# Patient Record
(demographics unavailable — no encounter records)

---

## 2024-10-08 NOTE — HISTORY OF PRESENT ILLNESS
[Diabetes Mellitus] : Diabetes Mellitus [Hyperlipidemia] : Hyperlipidemia [Hypertension] : Hypertension [Obesity] : Obesity [No episodes] : No hypoglycemic episodes since the last visit. [Does not check] : Patient does not check blood glucose regularly [Regular podiatrist visits] : Patient did not have regular podiatrist visit [Understanding of foot care] : Patient expressed understanding of foot care [Most Recent A1C: ___] : Most recent A1C was [unfilled] [Target A1C:  ___] : Target A1C is [unfilled] [Moderate Intensity] : Patient is currently on moderate intensity statin  therapy [de-identified] : none [de-identified] : does use topical otc for fungus of both great toes during the wintertime- they now clear, has eye appt in the next couple weeks, urine today for micro [Does not check BP] : The patient is not checking blood pressure [<140/90] : Target blood pressure is <140/90 [Target goal met] : BP target goal met [de-identified] : continuing with low salt DASH type diet  [Stable] : Patient is stable [Managed with medications] : managed with  medication [Low Intensity Therapy] : Patient is currently on low intensity statin  therapy [Weight Gain ___ Pounds] : Weight gain of [unfilled] pounds [___ # of attempts] : [unfilled] weight lost attempts [Following Diet Successfully] : Following the diet successfully [___ times per week] : Patient exercises [unfilled] times per week [Following  treatment as advised] : Patient is following  treatment as advised [Maintenance] : Maintenance: Patient is following a plan to lose weight, for longer than 6 months  but less than 5 years [de-identified] : slight gain since last appt but was missing medication for short time,  [de-identified] : swimming now  [de-identified] : glp1 RA as well for DM control- denies any GI SE and has been doing well

## 2024-10-08 NOTE — PHYSICAL EXAM
[No Acute Distress] : no acute distress [Normal Sclera/Conjunctiva] : normal sclera/conjunctiva [Normal Outer Ear/Nose] : the outer ears and nose were normal in appearance [No JVD] : no jugular venous distention [No Respiratory Distress] : no respiratory distress  [No Accessory Muscle Use] : no accessory muscle use [Clear to Auscultation] : lungs were clear to auscultation bilaterally [Normal Rate] : normal rate  [Regular Rhythm] : with a regular rhythm [Normal S1, S2] : normal S1 and S2 [No Murmur] : no murmur heard [Pedal Pulses Present] : the pedal pulses are present [No Edema] : there was no peripheral edema [No Focal Deficits] : no focal deficits [Normal Gait] : normal gait [Normal Affect] : the affect was normal [Alert and Oriented x3] : oriented to person, place, and time [Normal Insight/Judgement] : insight and judgment were intact [None] : no ulcers in either foot were found [] : both feet [de-identified] : obese [de-identified] : right knee with some fluid in the area below the patella, normal ROM flexion, extension but tender in the area of the patella [de-identified] : skin not dry today advised continued moisturizer regularly almost no dryness today [de-identified] : fungal infection of the toes has cleared [de-identified] : minimal decrease sensation of the right great toe in area of scar tissue from bunion surgery- no change from prior exam [TWNoteComboBox3] : +2 [TWNoteComboBox4] : +2

## 2024-10-08 NOTE — PHYSICAL EXAM
[No Acute Distress] : no acute distress [Normal Sclera/Conjunctiva] : normal sclera/conjunctiva [Normal Outer Ear/Nose] : the outer ears and nose were normal in appearance [No JVD] : no jugular venous distention [No Respiratory Distress] : no respiratory distress  [No Accessory Muscle Use] : no accessory muscle use [Clear to Auscultation] : lungs were clear to auscultation bilaterally [Normal Rate] : normal rate  [Regular Rhythm] : with a regular rhythm [Normal S1, S2] : normal S1 and S2 [No Murmur] : no murmur heard [Pedal Pulses Present] : the pedal pulses are present [No Edema] : there was no peripheral edema [No Focal Deficits] : no focal deficits [Normal Gait] : normal gait [Normal Affect] : the affect was normal [Alert and Oriented x3] : oriented to person, place, and time [Normal Insight/Judgement] : insight and judgment were intact [None] : no ulcers in either foot were found [] : both feet [de-identified] : right knee with some fluid in the area below the patella, normal ROM flexion, extension but tender in the area of the patella [de-identified] : obese [de-identified] : skin not dry today advised continued moisturizer regularly almost no dryness today [de-identified] : fungal infection of the toes has cleared [de-identified] : minimal decrease sensation of the right great toe in area of scar tissue from bunion surgery- no change from prior exam [TWNoteComboBox3] : +2 [TWNoteComboBox4] : +2

## 2024-10-08 NOTE — REVIEW OF SYSTEMS
[Muscle Pain] : muscle pain [Back Pain] : back pain [Headache] : no headache [Negative] : Psychiatric [FreeTextEntry9] : cramping in the right leg in the medial thigh to the knee, trouble stretching it to stop the cramp

## 2024-10-08 NOTE — COUNSELING
[Potential consequences of obesity discussed] : Potential consequences of obesity discussed [Benefits of weight loss discussed] : Benefits of weight loss discussed [Encouraged to increase physical activity] : Encouraged to increase physical activity [Decrease Portions] : decrease portions [FreeTextEntry1] : WW and ozempic

## 2024-10-08 NOTE — ASSESSMENT
[FreeTextEntry1] : review of medications, results of labs from 3 weeks ago, no changes at this time, tolerating ozempic with good weight loss and control of diabetes at this time weight 278 now at 248  slight increase in weight since last visit - small gap for ozempic- now taking regularly at optimized dose- continue with same review of labs from last week. urine for micro obtained today and order reinstated moderate MDM, continuity of care with f/u in 3-4 months

## 2024-10-08 NOTE — HISTORY OF PRESENT ILLNESS
[Diabetes Mellitus] : Diabetes Mellitus [Hyperlipidemia] : Hyperlipidemia [Hypertension] : Hypertension [Obesity] : Obesity [No episodes] : No hypoglycemic episodes since the last visit. [Does not check] : Patient does not check blood glucose regularly [Regular podiatrist visits] : Patient did not have regular podiatrist visit [Understanding of foot care] : Patient expressed understanding of foot care [Most Recent A1C: ___] : Most recent A1C was [unfilled] [Target A1C:  ___] : Target A1C is [unfilled] [Moderate Intensity] : Patient is currently on moderate intensity statin  therapy [de-identified] : none [de-identified] : does use topical otc for fungus of both great toes during the wintertime- they now clear, has eye appt in the next couple weeks, urine today for micro [Does not check BP] : The patient is not checking blood pressure [<140/90] : Target blood pressure is <140/90 [Target goal met] : BP target goal met [de-identified] : continuing with low salt DASH type diet  [Stable] : Patient is stable [Managed with medications] : managed with  medication [Low Intensity Therapy] : Patient is currently on low intensity statin  therapy [Weight Gain ___ Pounds] : Weight gain of [unfilled] pounds [___ # of attempts] : [unfilled] weight lost attempts [Following Diet Successfully] : Following the diet successfully [___ times per week] : Patient exercises [unfilled] times per week [Following  treatment as advised] : Patient is following  treatment as advised [Maintenance] : Maintenance: Patient is following a plan to lose weight, for longer than 6 months  but less than 5 years [de-identified] : slight gain since last appt but was missing medication for short time,  [de-identified] : swimming now  [de-identified] : glp1 RA as well for DM control- denies any GI SE and has been doing well

## 2025-01-20 NOTE — ASSESSMENT
[FreeTextEntry1] : advised patient to keep next appt. recheck for lymph nodes and consider repeat ct scan.  labs ordered for regular follow up of chronic disease  moderate level MDM for follow up of undx issue - lymphadenopathy continuity of care for this visit

## 2025-01-20 NOTE — PHYSICAL EXAM
[No Acute Distress] : no acute distress [Normal Sclera/Conjunctiva] : normal sclera/conjunctiva [PERRL] : pupils equal round and reactive to light [EOMI] : extraocular movements intact [Fundoscopic Exam Performed] : fundoscopic ~T exam ~C was performed [Normal Outer Ear/Nose] : the outer ears and nose were normal in appearance [Normal Oropharynx] : the oropharynx was normal [Normal TMs] : both tympanic membranes were normal [No JVD] : no jugular venous distention [Supple] : supple [Thyroid Normal, No Nodules] : the thyroid was normal and there were no nodules present [No Respiratory Distress] : no respiratory distress  [No Accessory Muscle Use] : no accessory muscle use [Clear to Auscultation] : lungs were clear to auscultation bilaterally [Normal Rate] : normal rate  [Regular Rhythm] : with a regular rhythm [Normal S1, S2] : normal S1 and S2 [Normal Supraclavicular Nodes] : no supraclavicular lymphadenopathy [Normal Posterior Cervical Nodes] : no posterior cervical lymphadenopathy [Normal Anterior Cervical Nodes] : no anterior cervical lymphadenopathy [de-identified] : no palpable nodes at this time

## 2025-01-20 NOTE — HEALTH RISK ASSESSMENT
[Yes] : Yes [No] : In the past 12 months have you used drugs other than those required for medical reasons? No [Any fall with injury in past year] : Patient reported fall with injury in the past year [Intercurrent ED visits] : went to ED [Former] : Former [10-14] : 10-14 [> 15 Years] : > 15 Years [de-identified] : none [de-identified] : walking [de-identified] : not good over holidays [de-identified] : quit 1992

## 2025-01-20 NOTE — HISTORY OF PRESENT ILLNESS
[FreeTextEntry1] : CC: fell from chair while at work 1/10/25. In falling she injured her head banging it along the floor/wall when she fell. She had no LOC but did incur a lump on the back of her head from the fall. She was seen in the ER and did have a CT scan of the head -non-contrast. which failed to reveal any acute head injury such as fracture or bleeding. Hematoma of the scalp was noted as were several small posterior lymph nodes likely secondary to previous illness. She is here today for follow up.

## 2025-01-20 NOTE — REVIEW OF SYSTEMS
[Muscle Pain] : muscle pain negative [Back Pain] : back pain [Negative] : Psychiatric [Headache] : no headache [FreeTextEntry9] : cramping in the right leg in the medial thigh to the knee, trouble stretching it to stop the cramp [de-identified] : tenderness over the hematoma of the scalp

## 2025-01-20 NOTE — REVIEW OF SYSTEMS
[Muscle Pain] : muscle pain [Back Pain] : back pain [Negative] : Psychiatric [Headache] : no headache [FreeTextEntry9] : cramping in the right leg in the medial thigh to the knee, trouble stretching it to stop the cramp [de-identified] : tenderness over the hematoma of the scalp

## 2025-01-20 NOTE — PHYSICAL EXAM
[No Acute Distress] : no acute distress [Normal Sclera/Conjunctiva] : normal sclera/conjunctiva [PERRL] : pupils equal round and reactive to light [EOMI] : extraocular movements intact [Fundoscopic Exam Performed] : fundoscopic ~T exam ~C was performed [Normal Outer Ear/Nose] : the outer ears and nose were normal in appearance [Normal Oropharynx] : the oropharynx was normal [Normal TMs] : both tympanic membranes were normal [No JVD] : no jugular venous distention [Supple] : supple [Thyroid Normal, No Nodules] : the thyroid was normal and there were no nodules present [No Respiratory Distress] : no respiratory distress  [No Accessory Muscle Use] : no accessory muscle use [Clear to Auscultation] : lungs were clear to auscultation bilaterally [Normal Rate] : normal rate  [Regular Rhythm] : with a regular rhythm [Normal S1, S2] : normal S1 and S2 [Normal Supraclavicular Nodes] : no supraclavicular lymphadenopathy [Normal Posterior Cervical Nodes] : no posterior cervical lymphadenopathy [Normal Anterior Cervical Nodes] : no anterior cervical lymphadenopathy [de-identified] : no palpable nodes at this time

## 2025-01-20 NOTE — HEALTH RISK ASSESSMENT
[Yes] : Yes [No] : In the past 12 months have you used drugs other than those required for medical reasons? No [Any fall with injury in past year] : Patient reported fall with injury in the past year [Intercurrent ED visits] : went to ED [Former] : Former [10-14] : 10-14 [> 15 Years] : > 15 Years [de-identified] : none [de-identified] : walking [de-identified] : not good over holidays [de-identified] : quit 1992

## 2025-03-26 NOTE — PHYSICAL EXAM
[No Acute Distress] : no acute distress [Normal Sclera/Conjunctiva] : normal sclera/conjunctiva [Normal Outer Ear/Nose] : the outer ears and nose were normal in appearance [No JVD] : no jugular venous distention [No Respiratory Distress] : no respiratory distress  [No Accessory Muscle Use] : no accessory muscle use [Clear to Auscultation] : lungs were clear to auscultation bilaterally [Normal Rate] : normal rate  [Regular Rhythm] : with a regular rhythm [Normal S1, S2] : normal S1 and S2 [No Murmur] : no murmur heard [Pedal Pulses Present] : the pedal pulses are present [No Edema] : there was no peripheral edema [No Focal Deficits] : no focal deficits [Normal Gait] : normal gait [Normal Affect] : the affect was normal [Alert and Oriented x3] : oriented to person, place, and time [Normal Insight/Judgement] : insight and judgment were intact [None] : no ulcers in either foot were found [] : both feet [de-identified] : obese [de-identified] : right knee with some fluid in the area below the patella, normal ROM flexion, extension but tender in the area of the patella [de-identified] : skin not dry today advised continued moisturizer regularly almost no dryness today [de-identified] : fungal infection of the toes has cleared [de-identified] : minimal decrease sensation of the right great toe in area of scar tissue from bunion surgery- no change from prior exam [TWNoteComboBox3] : +2 [TWNoteComboBox4] : +2

## 2025-03-26 NOTE — REVIEW OF SYSTEMS
[Muscle Pain] : muscle pain [Back Pain] : back pain [Negative] : Psychiatric [Headache] : no headache [FreeTextEntry9] : cramping in the right leg in the medial thigh to the knee, trouble stretching it to stop the cramp

## 2025-03-26 NOTE — PHYSICAL EXAM
[No Acute Distress] : no acute distress [Normal Sclera/Conjunctiva] : normal sclera/conjunctiva [Normal Outer Ear/Nose] : the outer ears and nose were normal in appearance [No JVD] : no jugular venous distention [No Respiratory Distress] : no respiratory distress  [No Accessory Muscle Use] : no accessory muscle use [Clear to Auscultation] : lungs were clear to auscultation bilaterally [Normal Rate] : normal rate  [Regular Rhythm] : with a regular rhythm [Normal S1, S2] : normal S1 and S2 [No Murmur] : no murmur heard [Pedal Pulses Present] : the pedal pulses are present [No Edema] : there was no peripheral edema [No Focal Deficits] : no focal deficits [Normal Gait] : normal gait [Normal Affect] : the affect was normal [Alert and Oriented x3] : oriented to person, place, and time [Normal Insight/Judgement] : insight and judgment were intact [None] : no ulcers in either foot were found [] : both feet [de-identified] : obese [de-identified] : right knee with some fluid in the area below the patella, normal ROM flexion, extension but tender in the area of the patella [de-identified] : skin not dry today advised continued moisturizer regularly almost no dryness today [de-identified] : fungal infection of the toes has cleared [de-identified] : minimal decrease sensation of the right great toe in area of scar tissue from bunion surgery- no change from prior exam [TWNoteComboBox3] : +2 [TWNoteComboBox4] : +2

## 2025-03-26 NOTE — HISTORY OF PRESENT ILLNESS
[Diabetes Mellitus] : Diabetes Mellitus [Hyperlipidemia] : Hyperlipidemia [Hypertension] : Hypertension [Obesity] : Obesity [No episodes] : No hypoglycemic episodes since the last visit. [Does not check] : Patient does not check blood glucose regularly [Understanding of foot care] : Patient expressed understanding of foot care [Most Recent A1C: ___] : Most recent A1C was [unfilled] [Target A1C:  ___] : Target A1C is [unfilled] [Moderate Intensity] : Patient is currently on moderate intensity statin  therapy [Does not check BP] : The patient is not checking blood pressure [<140/90] : Target blood pressure is <140/90 [Target goal met] : BP target goal met [Stable] : Patient is stable [Managed with medications] : managed with  medication [Low Intensity Therapy] : Patient is currently on low intensity statin  therapy [Weight Gain ___ Pounds] : Weight gain of [unfilled] pounds [___ # of attempts] : [unfilled] weight lost attempts [Following Diet Successfully] : Following the diet successfully [___ times per week] : Patient exercises [unfilled] times per week [Following  treatment as advised] : Patient is following  treatment as advised [Maintenance] : Maintenance: Patient is following a plan to lose weight, for longer than 6 months  but less than 5 years [Regular podiatrist visits] : Patient did not have regular podiatrist visit [de-identified] : none [de-identified] : continuing with low salt DASH type diet also low fat [Weight Loss ___ Pounds] : Weight loss of [unfilled] pounds [de-identified] : slight loss since last appt, asking about change in dose of meds [de-identified] : swimming now  [de-identified] : glp1 RA as well for DM control- denies any GI SE and has been doing well but missed doses due to med not available and thus weight gain again-

## 2025-03-26 NOTE — ASSESSMENT
[FreeTextEntry1] : review of medications,no changes at this time, tolerating ozempic with issue in obtaining the med for a while resulting in weight gain and some increase in glucose over that time frame. weight gain secondary to being off meds also A1C increased again due to the same and now weight loss last 3 months with full dose of ozempic- tolerating ok without much GI upset review of recent labs low level MDM, continuity of care with f/u in 3 months to call for any side effects of GLP1-ra-

## 2025-03-26 NOTE — CURRENT MEDS
[Takes medication as prescribed] : takes [None] : Patient does not have any barriers to medication adherence [Yes] : Reviewed medication list for presence of high-risk medications. [FreeTextEntry1] : GLP1

## 2025-03-26 NOTE — HISTORY OF PRESENT ILLNESS
[Diabetes Mellitus] : Diabetes Mellitus [Hyperlipidemia] : Hyperlipidemia [Hypertension] : Hypertension [Obesity] : Obesity [No episodes] : No hypoglycemic episodes since the last visit. [Does not check] : Patient does not check blood glucose regularly [Understanding of foot care] : Patient expressed understanding of foot care [Most Recent A1C: ___] : Most recent A1C was [unfilled] [Target A1C:  ___] : Target A1C is [unfilled] [Moderate Intensity] : Patient is currently on moderate intensity statin  therapy [Does not check BP] : The patient is not checking blood pressure [<140/90] : Target blood pressure is <140/90 [Target goal met] : BP target goal met [Stable] : Patient is stable [Managed with medications] : managed with  medication [Low Intensity Therapy] : Patient is currently on low intensity statin  therapy [Weight Gain ___ Pounds] : Weight gain of [unfilled] pounds [___ # of attempts] : [unfilled] weight lost attempts [Following Diet Successfully] : Following the diet successfully [___ times per week] : Patient exercises [unfilled] times per week [Following  treatment as advised] : Patient is following  treatment as advised [Maintenance] : Maintenance: Patient is following a plan to lose weight, for longer than 6 months  but less than 5 years [Regular podiatrist visits] : Patient did not have regular podiatrist visit [de-identified] : none [de-identified] : continuing with low salt DASH type diet also low fat [Weight Loss ___ Pounds] : Weight loss of [unfilled] pounds [de-identified] : slight loss since last appt, asking about change in dose of meds [de-identified] : swimming now  [de-identified] : glp1 RA as well for DM control- denies any GI SE and has been doing well but missed doses due to med not available and thus weight gain again-

## 2025-04-23 NOTE — HISTORY OF PRESENT ILLNESS
[de-identified] : 62 year old female with significant PMHx of DM on Ozempic for 2 years, last documented HgbA1C is a 6.2%.  presents for follow-up visit today s/p right knee Durolane injection on last visit on 8/7/2024. Patient also had a right knee cortisone injection on 7/24/2024. Patient states that right knee pain has been stable, improves s/p gel and cortisone injections. Patient denies any radiating pain, swelling or buckling, numbness/tingling and weakness to LE. Patient denies any aggravating factors. Patient denies taking any prescription or over-the-counter pain medications at this time. Of note, patient reports history of a "knee fracture" about 10 years ago. She wore 2 different braces and healed well. Patient otherwise states that she has been in her usual state of health. Patient ambulates without assistance and can perform ADL's independently.   Radiology Results: 7/24/2024 Right Knee: Standing AP, lateral, tunnel and skyline views were obtained and reveal severe medial and moderate patellofemoral arthritis

## 2025-04-23 NOTE — DISCUSSION/SUMMARY
[de-identified] : I went over the pathophysiology of the patient's symptoms in great detail with the patient. Viscosupplementation was discussed as a solution to the patient's symptoms and a booklet with information was provided. Viscosupplementation was discussed as a solution to the patient's symptoms, and we are requesting Durolane for right knee. I stressed the importance of weight loss and its benefits to the patients joints and overall health.   All of their questions were answered. They understand and consent to the plan.   FU after authorization.

## 2025-04-23 NOTE — ADDENDUM
[FreeTextEntry1] : I, KAMILLA COLEMAN, acted solely as a scribe for Dr. Patric Welsh on this date 04/23/2025.  All medical record entries made by the Scribe were at my, Dr. Patric Welsh, direction and personally dictated by me on 04/23/2025. I have reviewed the chart and agree that the record accurately reflects my personal performance of the history, physical exam, assessment and plan. I have also personally directed, reviewed, and agreed with the chart.

## 2025-04-23 NOTE — PHYSICAL EXAM
[de-identified] : Constitutional o Appearance : well-nourished, well developed, alert, in no acute distress  Head and Face o Head :  Inspection : atraumatic, normocephalic o Face :  Inspection : no visible rash or discoloration Respiratory o Respiratory Effort: breathing unlabored  Neurologic o Mental Status Examination :  Orientation : grossly oriented to person, place and time Psychiatric o Mood and Affect: mood normal, affect appropriate   Right Lower Extremity o Buttock : no tenderness, swelling or deformities  o Right Hip :  Inspection/Palpation : no tenderness, swelling or deformities  Range of Motion : full and painless in all planes, no crepitance  Stability : joint stability intact  Strength : extension, flexion, adduction, abduction, internal rotation and external rotation 5/5   o Right Knee :  Inspection/Palpation : lateral compartment tenderness,  to palpation, no swelling  Range of Motion :  0-120, no crepitance, decreased patellofemoral glide,   Stability : no valgus or varus instability present on provocative testing  Strength : flexion and extension 5/5  Tests and Signs : Anterior Drawer negative, Lachman negative, Hakan's negative  Left Lower Extremity o Buttock : no tenderness, swelling or deformities  o Left Hip :  Inspection/Palpation : no tenderness, no swelling or deformities  Range of Motion : full and painless in all planes, no crepitance  Stability : joint stability intact  Strength : extension, flexion, adduction, abduction, internal rotation and external rotation 5/5  o Left Knee :  Inspection/Palpation : no tenderness to palpation, no swelling  Range of Motion : 0-120 active flexion and extension full and painless, no crepitance  Stability : no valgus or varus instability present on provocative testing  Strength : flexion and extension 5/5  Tests and Signs : Anterior Drawer negative, Lachman negative, Hakan's negative  Gait: normal gait, no foot drop, no significant extremity swelling or lymphedem

## 2025-04-30 NOTE — HISTORY OF PRESENT ILLNESS
[de-identified] : 62 year old female with significant PMHx of DM on Ozempic for 2 years, last documented HgbA1C is a 6.2%.  presents for a right knee Durolane injection today 4/30/2025. Patient denies any radiating pain, swelling or buckling, numbness/tingling and weakness to LE. Patient denies any aggravating factors. Patient denies taking any prescription or over-the-counter pain medications at this time. Of note, patient reports history of a "knee fracture" about 10 years ago. She wore 2 different braces and healed well. Patient otherwise states that she has been in her usual state of health. Patient ambulates without assistance and can perform ADL's independently.   Radiology Results: 7/24/2024 Right Knee: Standing AP, lateral, tunnel and skyline views were obtained and reveal severe medial and moderate patellofemoral arthritis

## 2025-04-30 NOTE — ADDENDUM
[FreeTextEntry1] : I, KAMILLA COLEMAN, acted solely as a scribe for Dr. Patric Welsh on this date 04/30/2025.  All medical record entries made by the Scribe were at my, Dr. Patric Welsh, direction and personally dictated by me on 04/30/2025. I have reviewed the chart and agree that the record accurately reflects my personal performance of the history, physical exam, assessment and plan. I have also personally directed, reviewed, and agreed with the chart.

## 2025-04-30 NOTE — PHYSICAL EXAM
[de-identified] : Constitutional o Appearance : well-nourished, well developed, alert, in no acute distress  Head and Face o Head :  Inspection : atraumatic, normocephalic o Face :  Inspection : no visible rash or discoloration Respiratory o Respiratory Effort: breathing unlabored  Neurologic o Mental Status Examination :  Orientation : grossly oriented to person, place and time Psychiatric o Mood and Affect: mood normal, affect appropriate   Right Lower Extremity o Buttock : no tenderness, swelling or deformities  o Right Hip :  Inspection/Palpation : no tenderness, swelling or deformities  Range of Motion : full and painless in all planes, no crepitance  Stability : joint stability intact  Strength : extension, flexion, adduction, abduction, internal rotation and external rotation 5/5   o Right Knee :  Inspection/Palpation : lateral compartment tenderness,  to palpation, no swelling  Range of Motion :  0-120, no crepitance, decreased patellofemoral glide, mild crepitance,  Stability : no valgus or varus instability present on provocative testing  Strength : flexion and extension 5/5  Tests and Signs : Anterior Drawer negative, Lachman negative, Hakan's negative  Left Lower Extremity o Buttock : no tenderness, swelling or deformities  o Left Hip :  Inspection/Palpation : no tenderness, no swelling or deformities  Range of Motion : full and painless in all planes, no crepitance  Stability : joint stability intact  Strength : extension, flexion, adduction, abduction, internal rotation and external rotation 5/5  o Left Knee :  Inspection/Palpation : no tenderness to palpation, no swelling  Range of Motion : 0-120 active flexion and extension full and painless, no crepitance  Stability : no valgus or varus instability present on provocative testing  Strength : flexion and extension 5/5  Tests and Signs : Anterior Drawer negative, Lachman negative, Hakan's negative  Gait: normal gait, no foot drop, no significant extremity swelling or lymphedema  o Knee injection : Indication- knee osteoarthritis, Anatomic location- right intra-articular joint space, Spray - area was sterilized with Betadine and alcohol and anesthetized with Ethyl Chloride , needle used-20G, Medications given- 60mg/3mL Durolane under Ultrasound guidance. The patient tolerated the procedure well.

## 2025-04-30 NOTE — DISCUSSION/SUMMARY
[de-identified] : I went over the pathophysiology of the patient's symptoms in great detail with the patient. The patient elected to receive a Durolane injection into her right knee today, and tolerated it well. I instructed the patient on ROM exercises, and told them to take it easy. The use of ice and rest was reviewed with the patient. The patient may resume activities tomorrow. I reminded the patient that it takes 4 to 6 weeks after the final injection to feel symptom relief.   All of their questions were answered. They understand and consent to the plan.   FU in 6 weeks

## 2025-06-11 NOTE — DISCUSSION/SUMMARY
[de-identified] : I went over the pathophysiology of the patient's symptoms in great detail with the patient. She needs to avoid high-impact activities such as running and jumping or riding a treadmill. I recommend alternative activities such as riding a stationary bike or elliptical on low tension. She should focus on light weight and high repetition exercises. She should avoid squatting and kneeling. We discussed the use of ice, Tylenol and anti-inflammatories to relieve pain. The benefits of pool exercises were discussed in detail with the patient. I stressed the importance of weight loss and its benefits to the patients joints and overall health. I informed the patient they are due for a repeat gel injection any time after 10/30/2025 for the right knee.   All of their questions were answered. They understand and consent to the plan.   FU in 3 months. She will get right knee films upon return.

## 2025-06-11 NOTE — PHYSICAL EXAM
[de-identified] : Constitutional o Appearance : well-nourished, well developed, alert, in no acute distress  Head and Face o Head :  Inspection : atraumatic, normocephalic o Face :  Inspection : no visible rash or discoloration Respiratory o Respiratory Effort: breathing unlabored  Neurologic o Mental Status Examination :  Orientation : grossly oriented to person, place and time Psychiatric o Mood and Affect: mood normal, affect appropriate   Right Lower Extremity o Buttock : no tenderness, swelling or deformities  o Right Hip :  Inspection/Palpation : no tenderness, swelling or deformities  Range of Motion : full and painless in all planes, no crepitance  Stability : joint stability intact  Strength : extension, flexion, adduction, abduction, internal rotation and external rotation 4+/5   o Right Knee :  Inspection/Palpation : medial and lateral compartment tenderness to palpation, mild swelling  Range of Motion :  0-130, no crepitance, decreased patellofemoral glide, mild crepitance,  Stability : no valgus or varus instability present on provocative testing  Strength : flexion and extension 4+/5  Tests and Signs : Anterior Drawer negative, Lachman negative, Hakan's negative  Left Lower Extremity o Buttock : no tenderness, swelling or deformities  o Left Hip :  Inspection/Palpation : no tenderness, no swelling or deformities  Range of Motion : full and painless in all planes, no crepitance  Stability : joint stability intact  Strength : extension, flexion, adduction, abduction, internal rotation and external rotation 5/5  o Left Knee :  Inspection/Palpation : medial compartment tenderness tenderness to palpation, no swelling  Range of Motion : 0-120 active flexion and extension full and painless, no crepitance  Stability : no valgus or varus instability present on provocative testing  Strength : flexion and extension 5/5  Tests and Signs : Anterior Drawer negative, Lachman negative, Hakan's negative  Gait: normal gait, no foot drop, limited core strength, no significant extremity swelling or lymphedema

## 2025-06-11 NOTE — HISTORY OF PRESENT ILLNESS
[de-identified] : 62 year old female with significant PMHx of DM on Ozempic for 2 years, last documented HgbA1C is a 6.2%.  presents for a right knee follow-up. She had a right knee Durolane injection on 4/30/2025 and notes she is 70-80% better.  Patient denies any radiating pain, swelling or buckling, numbness/tingling and weakness to LE. Patient denies any aggravating factors. Patient denies taking any prescription or over-the-counter pain medications at this time. Of note, patient reports history of a "knee fracture" about 10 years ago. She wore 2 different braces and healed well.   Radiology Results: 7/24/2024 Right Knee: Standing AP, lateral, tunnel and skyline views were obtained and reveal severe medial and moderate patellofemoral arthritis

## 2025-06-11 NOTE — ADDENDUM
[FreeTextEntry1] : I, KAMILLA COLEMAN, acted solely as a scribe for Dr. Patric Welsh on this date 06/11/2025.  All medical record entries made by the Scribe were at my, Dr. Patric Welsh, direction and personally dictated by me on 06/11/2025. I have reviewed the chart and agree that the record accurately reflects my personal performance of the history, physical exam, assessment and plan. I have also personally directed, reviewed, and agreed with the chart.

## 2025-06-29 NOTE — HISTORY OF PRESENT ILLNESS
[Diabetes Mellitus] : Diabetes Mellitus [Hyperlipidemia] : Hyperlipidemia [Hypertension] : Hypertension [Obesity] : Obesity [No episodes] : No hypoglycemic episodes since the last visit. [Does not check] : Patient does not check blood glucose regularly [Understanding of foot care] : Patient expressed understanding of foot care [Most Recent A1C: ___] : Most recent A1C was [unfilled] [Target A1C:  ___] : Target A1C is [unfilled] [Moderate Intensity] : Patient is currently on moderate intensity statin  therapy [Does not check BP] : The patient is not checking blood pressure [<140/90] : Target blood pressure is <140/90 [Target goal met] : BP target goal met [Stable] : Patient is stable [Managed with medications] : managed with  medication [Low Intensity Therapy] : Patient is currently on low intensity statin  therapy [Weight Gain ___ Pounds] : Weight gain of [unfilled] pounds [___ # of attempts] : [unfilled] weight lost attempts [Following Diet Successfully] : Following the diet successfully [___ times per week] : Patient exercises [unfilled] times per week [Following  treatment as advised] : Patient is following  treatment as advised [Maintenance] : Maintenance: Patient is following a plan to lose weight, for longer than 6 months  but less than 5 years [Regular podiatrist visits] : Patient did not have regular podiatrist visit [de-identified] : none [de-identified] : continuing with low salt DASH type diet also low fat [de-identified] :  weight fairly stable at this time [de-identified] : swimming now  [de-identified] : glp1 RA as well for DM control- denies any GI SE and has been doing well but missed doses due to med not available and thus weight gain again-

## 2025-06-29 NOTE — HISTORY OF PRESENT ILLNESS
[Diabetes Mellitus] : Diabetes Mellitus [Hyperlipidemia] : Hyperlipidemia [Hypertension] : Hypertension [Obesity] : Obesity [No episodes] : No hypoglycemic episodes since the last visit. [Does not check] : Patient does not check blood glucose regularly [Understanding of foot care] : Patient expressed understanding of foot care [Most Recent A1C: ___] : Most recent A1C was [unfilled] [Target A1C:  ___] : Target A1C is [unfilled] [Moderate Intensity] : Patient is currently on moderate intensity statin  therapy [Does not check BP] : The patient is not checking blood pressure [<140/90] : Target blood pressure is <140/90 [Target goal met] : BP target goal met [Stable] : Patient is stable [Managed with medications] : managed with  medication [Low Intensity Therapy] : Patient is currently on low intensity statin  therapy [Weight Gain ___ Pounds] : Weight gain of [unfilled] pounds [___ # of attempts] : [unfilled] weight lost attempts [Following Diet Successfully] : Following the diet successfully [___ times per week] : Patient exercises [unfilled] times per week [Following  treatment as advised] : Patient is following  treatment as advised [Maintenance] : Maintenance: Patient is following a plan to lose weight, for longer than 6 months  but less than 5 years [Regular podiatrist visits] : Patient did not have regular podiatrist visit [de-identified] : none [de-identified] : continuing with low salt DASH type diet also low fat [de-identified] :  weight fairly stable at this time [de-identified] : glp1 RA as well for DM control- denies any GI SE and has been doing well but missed doses due to med not available and thus weight gain again-  [de-identified] : swimming now

## 2025-06-29 NOTE — REVIEW OF SYSTEMS
[Muscle Pain] : muscle pain [Back Pain] : back pain [Negative] : Psychiatric [Headache] : no headache [FreeTextEntry4] : sinus congestion for the last couple days but no cough or sob and denies roxi chest pain- feels like allergies  [FreeTextEntry9] : improvement of leg cramps

## 2025-06-29 NOTE — PHYSICAL EXAM
[No Acute Distress] : no acute distress [Normal Sclera/Conjunctiva] : normal sclera/conjunctiva [Normal Outer Ear/Nose] : the outer ears and nose were normal in appearance [No JVD] : no jugular venous distention [No Respiratory Distress] : no respiratory distress  [No Accessory Muscle Use] : no accessory muscle use [Clear to Auscultation] : lungs were clear to auscultation bilaterally [Normal Rate] : normal rate  [Regular Rhythm] : with a regular rhythm [Normal S1, S2] : normal S1 and S2 [No Murmur] : no murmur heard [Pedal Pulses Present] : the pedal pulses are present [No Edema] : there was no peripheral edema [No Focal Deficits] : no focal deficits [Normal Gait] : normal gait [Normal Affect] : the affect was normal [Alert and Oriented x3] : oriented to person, place, and time [Normal Insight/Judgement] : insight and judgment were intact [None] : no ulcers in either foot were found [] : both feet [de-identified] : obese [de-identified] : right knee with some fluid in the area below the patella, normal ROM flexion, extension but tender in the area of the patella [de-identified] : callous oin the bottom of the first metatarsal left foot- advise if persist podiatry [de-identified] : fungal infection of the toes has cleared [TWNoteComboBox3] : +2 [TWNoteComboBox4] : +2

## 2025-06-29 NOTE — COUNSELING
[Potential consequences of obesity discussed] : Potential consequences of obesity discussed [Benefits of weight loss discussed] : Benefits of weight loss discussed [Encouraged to increase physical activity] : Encouraged to increase physical activity [Decrease Portions] : decrease portions [____ min/wk Activity] : [unfilled] min/wk activity [Needs reinforcement, provided] : Patient needs reinforcement on understanding of disease, goals and obesity follow-up plan; reinforcement was provided [FreeTextEntry1] : WW and ozempic [FreeTextEntry3] : continue with ozempic for dm and weight loss, cardiovascular protection

## 2025-06-29 NOTE — ASSESSMENT
[FreeTextEntry1] : review of medications, no changes at this time, tolerating ozempic with issue in obtaining the med for a while resulting in weight gain and some increase in glucose over that time frame. weight gain secondary to being off meds also A1C increased again due to the same slight increase in weight since last visit - gap fr ozempic- now taking regularly at optimized dose- continue with same  moderate MDM, continuity of care with f/u in 3 months with labs as ordered to call for any side effects of GLP1-ra orders for labs and microalbumin also due for mammogram which is ordered today

## 2025-06-29 NOTE — PHYSICAL EXAM
[No Acute Distress] : no acute distress [Normal Sclera/Conjunctiva] : normal sclera/conjunctiva [Normal Outer Ear/Nose] : the outer ears and nose were normal in appearance [No JVD] : no jugular venous distention [No Respiratory Distress] : no respiratory distress  [No Accessory Muscle Use] : no accessory muscle use [Clear to Auscultation] : lungs were clear to auscultation bilaterally [Normal Rate] : normal rate  [Regular Rhythm] : with a regular rhythm [Normal S1, S2] : normal S1 and S2 [No Murmur] : no murmur heard [Pedal Pulses Present] : the pedal pulses are present [No Edema] : there was no peripheral edema [No Focal Deficits] : no focal deficits [Normal Gait] : normal gait [Normal Affect] : the affect was normal [Alert and Oriented x3] : oriented to person, place, and time [Normal Insight/Judgement] : insight and judgment were intact [None] : no ulcers in either foot were found [] : both feet [de-identified] : obese [de-identified] : right knee with some fluid in the area below the patella, normal ROM flexion, extension but tender in the area of the patella [de-identified] : callous oin the bottom of the first metatarsal left foot- advise if persist podiatry [de-identified] : fungal infection of the toes has cleared [TWNoteComboBox3] : +2 [TWNoteComboBox4] : +2 Adjacent Tissue Transfer Text: The defect edges were debeveled with a #15 scalpel blade.  Given the location of the defect and the proximity to free margins an adjacent tissue transfer was deemed most appropriate.  Using a sterile surgical marker, an appropriate flap was drawn incorporating the defect and placing the expected incisions within the relaxed skin tension lines where possible.    The area thus outlined was incised deep to adipose tissue with a #15 scalpel blade.  The skin margins were undermined to an appropriate distance in all directions utilizing iris scissors.

## 2025-07-09 NOTE — PHYSICAL EXAM
[No Acute Distress] : no acute distress [Normal Sclera/Conjunctiva] : normal sclera/conjunctiva [Normal Outer Ear/Nose] : the outer ears and nose were normal in appearance [No JVD] : no jugular venous distention [No Respiratory Distress] : no respiratory distress  [No Accessory Muscle Use] : no accessory muscle use [Clear to Auscultation] : lungs were clear to auscultation bilaterally [Normal Rate] : normal rate  [Regular Rhythm] : with a regular rhythm [Normal S1, S2] : normal S1 and S2 [No Murmur] : no murmur heard [Pedal Pulses Present] : the pedal pulses are present [No Edema] : there was no peripheral edema [No Focal Deficits] : no focal deficits [Normal Gait] : normal gait [Normal Affect] : the affect was normal [Alert and Oriented x3] : oriented to person, place, and time [Normal Insight/Judgement] : insight and judgment were intact [None] : no ulcers in either foot were found [] : both feet [de-identified] : obese [de-identified] : minimally tender behind the right knee but not in the thigh or the calf at this time [de-identified] : callous oin the bottom of the first metatarsal left foot- advise if persist podiatry [de-identified] : fungal infection of the toes has cleared [TWNoteComboBox3] : +2 [TWNoteComboBox4] : +2 [80992 - Moderate Complexity requires multiple possible diagnoses and/or the management options, moderate complexity of the medical data (tests, etc.) to be reviewed, and moderate risk of significant complications, morbidity, and/or mortality as well as co] : Moderate Complexity

## 2025-07-09 NOTE — PHYSICAL EXAM
[No Acute Distress] : no acute distress [Normal Sclera/Conjunctiva] : normal sclera/conjunctiva [Normal Outer Ear/Nose] : the outer ears and nose were normal in appearance [No JVD] : no jugular venous distention [No Respiratory Distress] : no respiratory distress  [No Accessory Muscle Use] : no accessory muscle use [Clear to Auscultation] : lungs were clear to auscultation bilaterally [Normal Rate] : normal rate  [Regular Rhythm] : with a regular rhythm [Normal S1, S2] : normal S1 and S2 [No Murmur] : no murmur heard [Pedal Pulses Present] : the pedal pulses are present [No Edema] : there was no peripheral edema [No Focal Deficits] : no focal deficits [Normal Gait] : normal gait [Normal Affect] : the affect was normal [Alert and Oriented x3] : oriented to person, place, and time [Normal Insight/Judgement] : insight and judgment were intact [None] : no ulcers in either foot were found [] : both feet [de-identified] : obese [de-identified] : minimally tender behind the right knee but not in the thigh or the calf at this time [de-identified] : callous oin the bottom of the first metatarsal left foot- advise if persist podiatry [de-identified] : fungal infection of the toes has cleared [TWNoteComboBox3] : +2 [TWNoteComboBox4] : +2 [84381 - Moderate Complexity requires multiple possible diagnoses and/or the management options, moderate complexity of the medical data (tests, etc.) to be reviewed, and moderate risk of significant complications, morbidity, and/or mortality as well as co] : Moderate Complexity

## 2025-07-09 NOTE — HEALTH RISK ASSESSMENT
[Intercurrent hospitalizations] : was admitted to the hospital  [Former] : Former [10-14] : 10-14 [> 15 Years] : > 15 Years [NO] : No [de-identified] : none [de-identified] : 1992 quit year

## 2025-07-09 NOTE — REVIEW OF SYSTEMS
[Nasal Discharge] : nasal discharge [Dyspnea on Exertion] : dyspnea on exertion [Muscle Pain] : muscle pain [Back Pain] : back pain [Headache] : no headache [Negative] : Psychiatric [FreeTextEntry4] : some allergy sx nasal [FreeTextEntry9] : improvement of leg cramps, pain in the right leg from DVT has resolved at this time

## 2025-07-09 NOTE — CURRENT MEDS
[Takes medication as prescribed] : takes [None] : Patient does not have any barriers to medication adherence [Yes] : Reviewed medication list for presence of high-risk medications. [Blood Thinners] : blood thinners [FreeTextEntry1] : eliquis

## 2025-07-09 NOTE — HEALTH RISK ASSESSMENT
[Intercurrent hospitalizations] : was admitted to the hospital  [Former] : Former [10-14] : 10-14 [> 15 Years] : > 15 Years [NO] : No [de-identified] : none [de-identified] : 1992 quit year

## 2025-07-09 NOTE — HISTORY OF PRESENT ILLNESS
[Post-hospitalization from ___ Hospital] : Post-hospitalization from [unfilled] Hospital [Admitted on: ___] : The patient was admitted on [unfilled] [Discharged on ___] : discharged on [unfilled] [Discharge Summary] : discharge summary [Radiology Findings] : radiology findings [Med Reconciliation] : medication reconciliation has been completed [Patient Contacted By: ____] : and contacted by [unfilled] [FreeTextEntry2] : 63F former smoker (approx 21 pack-years, quit 1992), PMH T2DM, HTN, HLD, OA, hepatic adenoma (s/p resection 1993), ovarian cyst (s/p unspecified intervention), fall w/ head trauma (1/2025, at which time dc'd from  ED to f/u outpatient PCP re suboccipital lymphadenopathy) presented to  ED for GAMEZ, found to have intermediate-high risk PE txferred to [facility] for PERT evaluation. AOx4, reports 4d prior to arrival developed sinus congestion, 2d PTA developed dyspnea while showering which progressively worsened into yesterday AM prompting ED presentation to [facility]. Not endorsing unintentional wt loss, night sweats, f/c, HA, lightheadedness, dizziness, visual disturbance, numbness, tingling, weakness, CP, palpitations, cough, abd pain, n/v/d, constipation, melena, hematochezia, dysuria, hematuria, change in urinary frequency/odor/appearance, or other complaint. Denies recent travel/relative immobility (though works sedentary job as ), or sick contact. Denies personal or FHx of VTE. Pt found to have intermediate-high risk PE and transferred to [facility] for PERT evaluation. Upon arrival, HR 90s-100s, BP 120s-160s/60s-80s, SpO2% 94-97 placed on 2L NC. WBC 12.34; DDimer 2517; Trop I 708.3 -> 750.4, HST 75, proBNP 994 -> 1232; VBG 7.32/47/31/24. CTA chest pulm art WAWIC: Bilateral pulmonary emboli, Prominent right heart. Pt admitted to medicine for management of high intermediate risk PE Hospital Course: PERT recommended no intervention. TTE done showing mildly enlarged right ventricular cavity size and probably normal right ventricular systolic function. Tricuspid annular plane systolic excursion (TAPSE) is 1.8 cm (normal >=1.7 cm). Pt was started on heparin gtt (full AC nomogram). LE duplex showed occlusive above the knee DVT affecting the right popliteal vein and the right femoral vein in the mid and distal thigh; below the knee thrombus affecting the right posterior tibial, soleal and gastrocnemius veins and the right posterior tibial peroneal trunk; no DVT found in left lower extremity. CT AP 6/30 with no findings of malignancy. Pt transitioned to DOAC on 6/30 and instructed to continue for at least 3 months for unprovoked PE. On day of discharge, patient is clinically stable with no new exam findings or acute symptoms compared to prior. The patient was seen by the attending physician on the date of discharge and deemed stable and acceptable for discharge. The patient's chronic medical conditions were treated accordingly per the patient's home medication regimen. The patient's medication reconciliation, follow up appointments, discharge instructions, and significant lab and diagnostic studies are as noted. Important Medication Changes and Reason: Eliquis 10 mg bid for 7 days, then eliquis 5 mg bid for 3 months Active or Pending Issues Requiring Follow-up: Primary Care Hematology. for ongoing hypercoagulable workup Advanced Directives: [x] Full code  [ ] DNR  [ ] Hospice    Discharge Diagnoses:  high-intermediate risk PE with extensive clot on DVT, malignancy workup -ve,  hypercoagulable workup sent, on DOAC

## 2025-07-09 NOTE — HISTORY OF PRESENT ILLNESS
[Post-hospitalization from ___ Hospital] : Post-hospitalization from [unfilled] Hospital [Admitted on: ___] : The patient was admitted on [unfilled] [Discharged on ___] : discharged on [unfilled] [Discharge Summary] : discharge summary [Radiology Findings] : radiology findings [Med Reconciliation] : medication reconciliation has been completed [Patient Contacted By: ____] : and contacted by [unfilled] [FreeTextEntry2] : 63F former smoker (approx 21 pack-years, quit 1992), PMH T2DM, HTN, HLD, OA, hepatic adenoma (s/p resection 1993), ovarian cyst (s/p unspecified intervention), fall w/ head trauma (1/2025, at which time dc'd from  ED to f/u outpatient PCP re suboccipital lymphadenopathy) presented to  ED for GAMEZ, found to have intermediate-high risk PE txferred to [facility] for PERT evaluation. AOx4, reports 4d prior to arrival developed sinus congestion, 2d PTA developed dyspnea while showering which progressively worsened into yesterday AM prompting ED presentation to [facility]. Not endorsing unintentional wt loss, night sweats, f/c, HA, lightheadedness, dizziness, visual disturbance, numbness, tingling, weakness, CP, palpitations, cough, abd pain, n/v/d, constipation, melena, hematochezia, dysuria, hematuria, change in urinary frequency/odor/appearance, or other complaint. Denies recent travel/relative immobility (though works sedentary job as ), or sick contact. Denies personal or FHx of VTE. Pt found to have intermediate-high risk PE and transferred to [facility] for PERT evaluation. Upon arrival, HR 90s-100s, BP 120s-160s/60s-80s, SpO2% 94-97 placed on 2L NC. WBC 12.34; DDimer 2517; Trop I 708.3 -> 750.4, HST 75, proBNP 994 -> 1232; VBG 7.32/47/31/24. CTA chest pulm art WAWIC: Bilateral pulmonary emboli, Prominent right heart. Pt admitted to medicine for management of high intermediate risk PE Hospital Course: PERT recommended no intervention. TTE done showing mildly enlarged right ventricular cavity size and probably normal right ventricular systolic function. Tricuspid annular plane systolic excursion (TAPSE) is 1.8 cm (normal >=1.7 cm). Pt was started on heparin gtt (full AC nomogram). LE duplex showed occlusive above the knee DVT affecting the right popliteal vein and the right femoral vein in the mid and distal thigh; below the knee thrombus affecting the right posterior tibial, soleal and gastrocnemius veins and the right posterior tibial peroneal trunk; no DVT found in left lower extremity. CT AP 6/30 with no findings of malignancy. Pt transitioned to DOAC on 6/30 and instructed to continue for at least 3 months for unprovoked PE. On day of discharge, patient is clinically stable with no new exam findings or acute symptoms compared to prior. The patient was seen by the attending physician on the date of discharge and deemed stable and acceptable for discharge. The patient's chronic medical conditions were treated accordingly per the patient's home medication regimen. The patient's medication reconciliation, follow up appointments, discharge instructions, and significant lab and diagnostic studies are as noted. Important Medication Changes and Reason: Eliquis 10 mg bid for 7 days, then eliquis 5 mg bid for 3 months Active or Pending Issues Requiring Follow-up: Primary Care Hematology. for ongoing hypercoagulable workup Advanced Directives: [x] Full code  [ ] DNR  [ ] Hospice    Discharge Diagnoses:  high-intermediate risk PE with extensive clot on DVT, malignancy workup -ve,  hypercoagulable workup sent, on DOAC

## 2025-07-09 NOTE — ASSESSMENT
[FreeTextEntry1] : hematology evaluation for hypercoagulable work up for unprovoked DVT/PE- continue eliquis as ordered 10 mg bid until Thursday then 5 mg BID pending further work up from hematology- referral given

## 2025-07-24 NOTE — HISTORY OF PRESENT ILLNESS
[de-identified] : (7/24/2025) 63-year-old Ms. JEFFERY is seen in consult for unprovoked PE/DVT. Patient developed sudden onset SOB while going to shower. The SOB resolved by rest but the next day she developed SOB again on minimal exertion prompting her to go the ED. She initially went to [facility] and was transfer later to [facility]. She denies any h/o trauma, long travel, hospital admission, sugary, or h/o previous clot. She denies any family h/o clot. She is currently on Eliquis.

## 2025-07-24 NOTE — ASSESSMENT
[FreeTextEntry1] : 63-year-old Ms. JEFFERY is seen in consult for unprovoked DVT/PE. She has no previous h/o thrombosis; no family h/o VTE. She may need indefinite anticoagulation.    [] Bilateral P/E, unprovoked  - On Eliquis, continue 3-6 months - May need indefinite anticoagulation - Watch for bleeding complications  - Thrombophilia w/u  - D-dimer   [] RLE DVT - On Eliquis, continue 3-6 months - May need indefinite anticoagulation - Watch for bleeding complications   RTC in 3 months